# Patient Record
Sex: MALE | Race: WHITE | NOT HISPANIC OR LATINO | URBAN - METROPOLITAN AREA
[De-identification: names, ages, dates, MRNs, and addresses within clinical notes are randomized per-mention and may not be internally consistent; named-entity substitution may affect disease eponyms.]

---

## 2017-07-27 ENCOUNTER — ALLSCRIPTS OFFICE VISIT (OUTPATIENT)
Dept: OTHER | Facility: OTHER | Age: 57
End: 2017-07-27

## 2017-07-27 DIAGNOSIS — A69.20 LYME DISEASE: ICD-10-CM

## 2017-07-29 ENCOUNTER — GENERIC CONVERSION - ENCOUNTER (OUTPATIENT)
Dept: OTHER | Facility: OTHER | Age: 57
End: 2017-07-29

## 2018-01-12 VITALS
HEIGHT: 71 IN | BODY MASS INDEX: 34.05 KG/M2 | HEART RATE: 60 BPM | WEIGHT: 243.2 LBS | SYSTOLIC BLOOD PRESSURE: 110 MMHG | DIASTOLIC BLOOD PRESSURE: 60 MMHG

## 2018-01-12 NOTE — RESULT NOTES
Message   Recorded as Task   Date: 08/02/2017 02:52 PM, Created By: Viviana Joseph   Task Name: Go to Note   Assigned To: [facility],Clinical Team   Regarding Patient: Sendy Ridley, Status: In Progress   Comment:    Shlomo Tate - 02 Aug 2017 2:52 PM     TASK CREATED  This is Dr Eloisa Maria patient  The Lyme titer turned out to be positive but it may be early Lyme and therefore he should continue his doxycycline until it is all gone  Dr Delmer Farley would like him to follow-up after the doxycycline is gone  Steffanie Mauro - 28 Aug 2017 4:20 PM     TASK IN Michelle Barnes - 02 Aug 2017 4:22 PM     TASK EDITED  [facility] to call for results     Lashawn Wetzel - 04 Aug 2017 11:35 AM     TASK EDITED  pt notified of results and recommendations        Signatures   Electronically signed by : Inna Espinoza, ; Aug  4 2017 11:35AM EST                       (Author)

## 2024-03-22 ENCOUNTER — TELEPHONE (OUTPATIENT)
Dept: CARDIAC SURGERY | Facility: CLINIC | Age: 64
End: 2024-03-22

## 2024-03-22 NOTE — TELEPHONE ENCOUNTER
Patient requesting to be seen for 3rd opinion of ASD closure.  LM for patient to contact the office.  Holding appointment time on Wednesday 3/27 w/ Dr. Diaz.

## 2024-03-27 ENCOUNTER — OFFICE VISIT (OUTPATIENT)
Dept: CARDIAC SURGERY | Facility: CLINIC | Age: 64
End: 2024-03-27
Payer: COMMERCIAL

## 2024-03-27 VITALS
SYSTOLIC BLOOD PRESSURE: 144 MMHG | OXYGEN SATURATION: 95 % | HEIGHT: 71 IN | WEIGHT: 250 LBS | RESPIRATION RATE: 18 BRPM | DIASTOLIC BLOOD PRESSURE: 88 MMHG | HEART RATE: 57 BPM | BODY MASS INDEX: 35 KG/M2

## 2024-03-27 DIAGNOSIS — Q21.10 ASD (ATRIAL SEPTAL DEFECT): Primary | ICD-10-CM

## 2024-03-27 PROBLEM — Z87.74 S/P PERCUTANEOUS PATENT FORAMEN OVALE CLOSURE: Status: ACTIVE | Noted: 2024-03-27

## 2024-03-27 PROCEDURE — 93000 ELECTROCARDIOGRAM COMPLETE: CPT | Performed by: INTERNAL MEDICINE

## 2024-03-27 PROCEDURE — 99205 OFFICE O/P NEW HI 60 MIN: CPT | Performed by: INTERNAL MEDICINE

## 2024-03-27 NOTE — PROGRESS NOTES
Cardiology Consultation  Interventional Cardiology and Structural Heart Clinic      Andre Sanders  1960  [id number]  [facility] [place]  [address]  [address]  [facility] [id number] [phone]    1. 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  Continued observation versus open surgical versus percutaneous closure of both defects.    This is his third opinion.  Spent 65 minutes face-to-face, 15 minutes reviewing transesophageal echocardiogram and 15 minutes of documentation.    He had what appeared to be procedure induced atrial fibrillation during the time of his suture based experimental/trial related Noblestitch procedure.  There remains a residual secundum ASD in the anterior superior position with over 5 mm of aortic rim.  He also has a posterior smaller defect which is possibly iatrogenic but present    Discussed at his age that there is a slight chance that he does have further right-sided chamber enlargement and a theoretical development of pulmonary hypertension but this is unknown given that he is 64yo and per his report feels well otherwise.  Additionally, his ASD can serve as a substrate for paradoxical embolism as well as atrial fibrillation particularly should the atria enlarge over time.  Again, these are possibilities but unclear if would definitively develop.    He asked me specifically what I would recommend if I were him.  Given the fact that there is seem to have been a neurological event with a coincidental finding of a CVA on his prior MRI that closure  of both defects is recommended.  This coupled with possibility of further RA/RV enlargement and development of significant pulmonary hypertension.   It is unknown whether one large defect albeit Pine Knot or Amplatzer would cover the posterior aspect.  My feeling is the anterior superior defect would need to be oversized to cover both.  This is less optimum.  As result, discussed may need  two devices.  He is not interested in open heart surgery to repair these defects.    He will discuss with his wife.  Literature provided as well as procedure discussed in detail. Discussed in detail embryology of ASD, relationship to paradoxical embolism, potential development of atrial fibrillation and further right sided chamber enlargement or pulmonary hypertension.  Understands risks which include but not limited to stroke, heart attack, death, need for urgent open-heart or vascular surgery to repair equipment induced injury.  Device migration both early and late which may require surgical removal.  Discussed arrhythmia development such as atrial fibrillation and palpitations.     He will call the office if he prefers to schedule.          History:     63-year-old male evaluation for ASD.    Patient had neurological symptoms back in January 2023 at [facility].  This prompted an MRI which showed cerebellar CVA.    This in turn prompted him to have a transesophageal echocardiogram where PFO/ASD was found.    He underwent ASD attempted closure with Noblestitch last year.  He did have a follow-up echo and transesophageal echocardiogram performed back in August 2023 which showed residual ASD.  There were 2 defects 1 in the anterior superior portion of the atrial septum as well as 1 more posterior.    He saw cardiology at [facility] and follow-up as well as at the [facility].    This is here now for his third opinion as decision as to what to do    Has had no recurrent neurological events    He did  have atrial fibrillation induced during his normal stitch procedure and required cardioversion.  He has had none since.    During that workup, he also had a chest CT which found a coincidental pulmonary embolism which was seen on his CTA pulmonary arteries.  That was in January 2023.    He otherwise feels well.      History reviewed. No pertinent past medical history.  Social History     Socioeconomic History    Marital status: /Civil Union     Spouse name: Not on file    Number of children: Not on file    Years of education: Not on file    Highest education level: Not on file   Occupational History    Not on file   Tobacco Use    Smoking status: Never    Smokeless tobacco: Never   Vaping Use    Vaping status: Never Used   Substance and Sexual Activity    Alcohol use: Not Currently    Drug use: Never    Sexual activity: Not on file   Other Topics Concern    Not on file   Social History Narrative    Not on file     Social Determinants of Health     Financial Resource Strain: Low Risk  (3/8/2023)    Received from [facility]    Overall Financial Resource Strain (CARDIA)     Difficulty of Paying Living Expenses: Not hard at all   Food Insecurity: No Food Insecurity (3/8/2023)    Received from [facility]    Hunger Vital Sign     Worried About Running Out of Food in the Last Year: Never true     Ran Out of Food in the Last Year: Never true   Transportation Needs: No Transportation Needs (3/8/2023)    Received from [facility]    PRAPARE - Transportation     Lack of Transportation (Medical): No     Lack of Transportation (Non-Medical): No   Physical Activity: Not on file   Stress: No Stress Concern Present (3/8/2023)    Received from [facility]    [place] [employer] - Occupational Stress Questionnaire     Feeling of Stress : Only a little   Social Connections: Socially Integrated (3/8/2023)    Received from [facility]    Social Connection and Isolation Panel [NHANES]     Frequency of Communication with Friends and Family: More than three times a week     Frequency of Social Gatherings with Friends and Family: More than three times a week     Attends Sikhism Services: More than 4 times per year     Active Member of Clubs or Organizations: Yes     Attends Club or Organization Meetings: More than 4 times per year     Marital Status:    Intimate Partner Violence: Not At Risk (3/8/2023)    Received from [facility]    Humiliation, Afraid, Rape, and Kick questionnaire     Fear of Current or Ex-Partner: No     Emotionally Abused: No     Physically Abused: No     Sexually Abused: No   Housing Stability: Low Risk  (3/8/2023)    Received from [facility]    Housing Stability Vital Sign     Unable to Pay for Housing in the Last Year: No     Number of Places Lived in the Last Year: 1     Unstable Housing in the Last Year: No      Family History   Problem Relation Age of Onset    Coronary artery disease Mother     Stroke Mother     Hypertension Father      Past Surgical History:   Procedure Laterality Date    US GUIDED PROSTATE BIOPSY  8/15/2023       Current Outpatient Medications:     apixaban (ELIQUIS) 5 mg, Take 5 mg by mouth 2 (two) times a day, Disp: , Rfl:   No Known Allergies    Social, Family and medication history as listed, reviewed and updated as necessary    Labs:   Lab Results   Component Value Date     02/07/2015    K 4.2 06/09/2023     06/09/2023    CO2 26 06/09/2023    BUN 15 06/09/2023    CREATININE 0.94 06/09/2023    CREATININE 0.85 04/06/2023    GLUCOSE 104 02/07/2015    CALCIUM 9.1 06/09/2023       No results found for: \"WBC\", \"HGB\", \"HCT\", \"PLT\"    No results found for: \"CHOL\"  No results found for: \"HDL\"  No results found for: \"LDLCALC\"  No results found for: \"TRIG\"  No results found for: \"LDLDIRECT\"    Lab Results   Component Value Date    ALT 23 " "04/06/2023    ALT 31 01/18/2023    AST 19 04/06/2023    AST 18 01/18/2023    ALKPHOS 70 04/06/2023    ALKPHOS 70 01/18/2023             No results found for: \"NTBNP\"    Lab Results   Component Value Date    HGBA1C 5.2 01/19/2023       Imaging: Reviewed in epic      Review of Systems:  14 systems reviewed and negative with exception of the above       PHYSICAL EXAM:        Vitals:    03/27/24 0949   BP: 144/88   Pulse: 57   Resp: 18   SpO2: 95%     Body mass index is 34.87 kg/m².  Weight (last 2 days)       Date/Time Weight    03/27/24 0949 113 (250)               Gen: No acute distress  HEENT: anicteric, mucous membranes moist  Neck: supple, no jugular venous distention, or carotid bruit  Heart: regular, normal s1 and s2, no murmur/rub or gallop  Lungs :clear to auscultation bilaterally, no rales/rhonchi or wheeze  Abdomen: soft nontender, normoactive bowel sounds, no organomegaly  Ext: warm and perfused, normal femoral pulses, no edema, or clubbing  Skin: warm, no rashes  Neuro: AAO x 3, no focal findings  Psychiatric: normal affect  Musculoskeletal: no obvious joint deformities.        This note was completed in part utilizing direct voice recognition software.   Grammatical errors, random word insertion, spelling mistakes, and incomplete sentences may be an occasional consequence of the system secondary to software limitations, ambient noise and hardware issues. At the time of dictation, efforts were made to edit, clarify and /or correct errors.  Please read the chart carefully and recognize, using context, where substitutions have occurred.  If you have any questions or concerns about the context, text or information contained within the body of this dictation, please contact myself, the provider, for further clarification.         "

## 2024-04-15 ENCOUNTER — TELEPHONE (OUTPATIENT)
Dept: CARDIOLOGY CLINIC | Facility: CLINIC | Age: 64
End: 2024-04-15

## 2024-04-15 NOTE — TELEPHONE ENCOUNTER
Caller: Pavithra    Doctor: EP    Reason for call: Cardiac scheduling- pt calling to schedule ASD placed.     Call back#: [phone]

## 2024-04-16 ENCOUNTER — PREP FOR PROCEDURE (OUTPATIENT)
Dept: CARDIAC SURGERY | Facility: CLINIC | Age: 64
End: 2024-04-16

## 2024-04-16 ENCOUNTER — TELEPHONE (OUTPATIENT)
Dept: CARDIAC SURGERY | Facility: CLINIC | Age: 64
End: 2024-04-16

## 2024-04-16 ENCOUNTER — TELEPHONE (OUTPATIENT)
Age: 64
End: 2024-04-16

## 2024-04-16 DIAGNOSIS — Q21.10 ASD (ATRIAL SEPTAL DEFECT): Primary | ICD-10-CM

## 2024-04-16 NOTE — LETTER
2024       Andre Sanders              : 1960        MRN: [id number]  [address]  [place] [address]       Procedure Name: SELINA RADER    Procedure date: 24    Location: [facility]  Address: [address] [place] [id number]      The hospital will contact you the day prior to your procedure, usually between 4PM - 6PM to instruct you on the time and place to report. If you do not hear from a [facility]  by 6PM the evening prior to your procedure, please contact the [facility] that you are scheduled at.      [facility]: [address] [place] [id number] - Short Stay Center [phone]       You may have nothing to eat or drink from midnight the night prior to your procedure. You may have a minimal amount of water with your morning medications. DO NOT stop taking Plavix or Aspirin unless advised otherwise.    If your procedure is scheduled after 12:00 noon, you may have clear liquids until 8:00AM the morning of your procedure. Clear liquids are 7UP, Ginger Ale, Jello or broth.    Arrange for a responsible person to drive you to and from the hospital.    Please shower/bathe the night before your procedure and do not use powders or lotions.    Please notify us if you have been admitted to the hospital within the past 30 days.    Bring a list of daily medications, vitamins, minerals, herbals and nutritional supplements you take. Include dosage and time you take them each day.    If packing an overnight bag, pack minimal clothing, you will be given hospital sleepwear. Do not bring money, valuables or jewelry. Wedding band is OK.    If you use CPAP machine, bring it to the hospital.      Have your Photo ID and Insurance cards with you.    DO NOT take any diabetic medication, including insulin, the morning of the procedure. Oral diabetic medications may include: Glucophage, Prandin, Glyburide, Micronase, Avandia, Glocovance, Precose, Glynase y  "Starlix.    You should continue to take your morning dose of heart and/or blood pressure medications with a sip of water UNLESS ADVISED OTHERWISE.    Special Instructions:    Medication holds:   Eliquis - Do not take the night before and morning of procedure.    Labs to be done on 5/7/24:  CMP / CBC (fasting 8 hours)         Thank you,   [place] \"Maritza\" [facility]  Surgery Coordinator  [facility]   [address] [place] [address]  Teams: [phone]              "

## 2024-04-16 NOTE — TELEPHONE ENCOUNTER
"Left voicemail on machine informing patient to call and schedule a ASD Closure procedure.     Thanks,  [place] \"Maritza\" Hermilo    "

## 2024-04-16 NOTE — TELEPHONE ENCOUNTER
Patient was seen by Dr. Diaz for 3rd opinion ASD closure on 3/27/24.  Patient called today and stated he is ready to schedule procedure.    Please reach out to patient to schedule, OK to leave a detailed message on his voicemail.

## 2024-04-17 NOTE — TELEPHONE ENCOUNTER
Pt's wife returned the call. Call was transferred to the cardiology office to schedule procedure.

## 2024-04-17 NOTE — TELEPHONE ENCOUNTER
Pt's wife Pavithra called cardiology wanting to schedule procedure. I advised Pavithra that I will get in touch with someone to give her a call. Pavithra wanted a number to call. Advised that I will look into this and try to find a number. Pavithra said that she needed to get off the phone d/t feeding a baby. Sent a teams message to Key to speak to Pavithra.

## 2024-04-18 NOTE — TELEPHONE ENCOUNTER
"Patient scheduled for ASD Closure on 5/21/24 at [facility] with Dr. Diaz.      This case to follow last TAVR case on 5/21/24 per Key.    Mailed patient instructions.     Patient aware of all general instructions.    Medication holds:   Eliquis - Do not take the night before and morning of procedure.    Labs to be done on 5/7/24:  CMP / CBC (fasting 8 hours)     Insurance: [employer]      Please obtain auth.     Steffanie: please mail instructions to patient's home address on file and labs.    Thank you,  [place] \"Maritza\" Hermilo    "

## 2024-04-18 NOTE — TELEPHONE ENCOUNTER
-----Original Message-----  From: Bigg Diaz <[email or web address]>   Sent: Wednesday, April 17, 2024 5:15 PM  To: Key Mei <[email or web address]>  Cc: Steffanie Mooney <[email or web address]>; [place] Akhtar <[email or web address]>  Subject: Asd case    Hi Key,    So talked with asd company, 5/21 in the afternoon is better than 5/16. I am still hybrid that day if we already have four tavrs I could do that as the fifth?    Gerson Worrell    Sent from my iPhone

## 2024-04-18 NOTE — TELEPHONE ENCOUNTER
"Returned call and left voicemail for patient's wife Pavithra to call back and schedule ASD Closure.     Thanks,  [place] \"Maritza\" Hermilo     "